# Patient Record
Sex: FEMALE | Race: WHITE | NOT HISPANIC OR LATINO | Employment: OTHER | URBAN - METROPOLITAN AREA
[De-identification: names, ages, dates, MRNs, and addresses within clinical notes are randomized per-mention and may not be internally consistent; named-entity substitution may affect disease eponyms.]

---

## 2017-02-02 PROBLEM — E03.4 HYPOTHYROIDISM DUE TO ACQUIRED ATROPHY OF THYROID: Status: ACTIVE | Noted: 2017-02-02

## 2017-02-02 PROBLEM — E78.5 DYSLIPIDEMIA, GOAL LDL BELOW 130: Status: ACTIVE | Noted: 2017-02-02

## 2017-07-12 ENCOUNTER — PATIENT OUTREACH (OUTPATIENT)
Dept: HEALTH INFORMATION MANAGEMENT | Facility: OTHER | Age: 82
End: 2017-07-12

## 2017-07-18 NOTE — PROGRESS NOTES
Attempt #:2    WebIZ Checked & Epic Updated: no  HealthConnect Verified: no  Verify PCP: yes    Communication Preference Obtained: yes     Review Care Team: yes    Annual Wellness Visit Scheduling  1. Scheduling Status:Not Scheduled. Patient states they are not interested        [place] (Attempt to Schedule EACH [place] Gap![place] [facility] [place]      [place] Due   Topic Date Due   • IMM DTaP/Tdap/Td Vaccine (1 - Tdap) 07/03/1949   • PAP SMEAR  07/03/1951   • BONE DENSITY  07/03/1995   • COLONOSCOPY  02/26/2005   • MAMMOGRAM  02/25/2017         [employer] Activation: sent activation code  [employer] Sangita: no  Virtual Visits: no  Opt In to Text Messages: no

## 2018-07-06 ENCOUNTER — OFFICE VISIT (OUTPATIENT)
Dept: URGENT CARE | Facility: CLINIC | Age: 83
End: 2018-07-06
Payer: MEDICARE

## 2018-07-06 ENCOUNTER — APPOINTMENT (OUTPATIENT)
Dept: RADIOLOGY | Facility: IMAGING CENTER | Age: 83
End: 2018-07-06
Attending: FAMILY MEDICINE
Payer: MEDICARE

## 2018-07-06 VITALS
BODY MASS INDEX: 22.15 KG/M2 | SYSTOLIC BLOOD PRESSURE: 138 MMHG | DIASTOLIC BLOOD PRESSURE: 80 MMHG | HEART RATE: 77 BPM | HEIGHT: 63 IN | WEIGHT: 125 LBS | TEMPERATURE: 98.1 F | OXYGEN SATURATION: 96 %

## 2018-07-06 DIAGNOSIS — T14.8XXA WOUND OF SKIN: ICD-10-CM

## 2018-07-06 DIAGNOSIS — S62.525B OPEN NONDISPLACED FRACTURE OF DISTAL PHALANX OF LEFT THUMB, INITIAL ENCOUNTER: Primary | ICD-10-CM
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
## 2018-07-06 DIAGNOSIS — S63.602A SPRAIN OF LEFT THUMB, UNSPECIFIED SITE OF FINGER, INITIAL ENCOUNTER: ICD-10-CM

## 2018-07-06 PROCEDURE — 73140 X-RAY EXAM OF FINGER(S): CPT | Mod: TC,LT | Performed by: FAMILY MEDICINE

## 2018-07-06 PROCEDURE — 90714 TD VACC NO PRESV 7 YRS+ IM: CPT | Performed by: FAMILY MEDICINE

## 2018-07-06 PROCEDURE — 99214 OFFICE O/P EST MOD 30 MIN: CPT | Mod: 25 | Performed by: FAMILY MEDICINE

## 2018-07-06 PROCEDURE — 12002 RPR S/N/AX/GEN/TRNK2.6-7.5CM: CPT | Performed by: FAMILY MEDICINE

## 2018-07-06 PROCEDURE — 90471 IMMUNIZATION ADMIN: CPT | Mod: 59 | Performed by: FAMILY MEDICINE

## 2018-07-06 RX ORDER — CEPHALEXIN 250 MG/1
250 CAPSULE ORAL 3 TIMES DAILY
Qty: 9 CAP | Refills: 0 | Status: SHIPPED | OUTPATIENT
Start: 2018-07-06 | End: 2018-07-09

## 2018-07-06 ASSESSMENT — ENCOUNTER SYMPTOMS
CHILLS: 0
FOCAL WEAKNESS: 0
DIZZINESS: 0
FEVER: 0

## 2018-07-06 NOTE — PROGRESS NOTES
"Subjective:      Vivian Basurto is a 88 y.o. female who presents with Laceration (laceration left thumb)    Chief Complaint   Patient presents with   • Laceration     laceration left thumb        - This is a very pleasant 88 y.o. female with complaints of trip/fall 1hr ago and hurt/cut Lt thumb. No head trauma/loc               ALLERGIES:  Patient has no known allergies.     PMH:  Past Medical History:   Diagnosis Date   • Exercise-induced asthma    • Hypothyroidism    • Osteopenia         MEDS:    Current Outpatient Prescriptions:   •  cephALEXin (KEFLEX) 250 MG Cap, Take 1 Cap by mouth 3 times a day for 3 days., Disp: 9 Cap, Rfl: 0  •  levothyroxine (SYNTHROID) 88 MCG Tab, Take 1 Tab by mouth Every morning on an empty stomach., Disp: 90 Tab, Rfl: 1  •  ATROVENT HFA 17 MCG/ACT Aero Soln, INHALE TWO PUFFS BY MOUTH EVERY 6 HOURS AND IN THE MORNING AS NEEDED FOR EXERCISE-INDUCED ASTHMA, Disp: 3 Inhaler, Rfl: 0  •  ibuprofen (MOTRIN) 200 MG TABS, Take 200 mg by mouth every 6 hours as needed. As needed , Disp: , Rfl:   •  acetaminophen (TYLENOL) 500 MG TABS, Take 500-1,000 mg by mouth every 6 hours as needed. As needed , Disp: , Rfl:     ** I have documented what I find to be significant in regards to past medical, social, family and surgical history  in my HPI or under PMH/PSH/FH review section, otherwise it is contributory **           HPI    Review of Systems   Constitutional: Negative for chills and fever.   Neurological: Negative for dizziness and focal weakness.          Objective:     /80   Pulse 77   Temp 36.7 °C (98.1 °F)   Ht 1.6 m (5' 3\")   Wt 56.7 kg (125 lb)   SpO2 96%   BMI 22.14 kg/m²      Physical Exam   Constitutional: She appears well-developed. No distress.   HENT:   Head: Normocephalic and atraumatic.   Cardiovascular: Regular rhythm.    No murmur heard.  Pulmonary/Chest: Effort normal. No respiratory distress.   Neurological: She is alert. She exhibits normal muscle tone.   Skin: Skin is " warm and dry.   Psychiatric: She has a normal mood and affect. Judgment normal.   Nursing note and vitals reviewed.  Lt thumb: full srom, NVI. ~2.7cm lac             Assessment/Plan:         1. Open nondisplaced fracture of distal phalanx of left thumb, initial encounter     2. Wound of skin  DX-FINGER(S) 2+ LEFT    TD =>6yo IM    cephALEXin (KEFLEX) 250 MG Cap   3. Fall, initial encounter     4. Sprain of left thumb, unspecified site of finger, initial encounter             Procedure: Laceration Repair    - wound cleaned and/or irrigated w/ NS.  -Sterile/clean technique throughout  -Local anesthesia with 2% lidocaine  -Closed with 7#  5-0 Nylon interrupted sutures with good wound approximation. Care was taken not to disturb/injure underlying bones, joint, joint-space, tendons, nerves and major vessels   -Polysporin and dressing placed  -Patient tolerated well  - wound care/instructions discussed  - 2 day wound check advised      f/u sports med thumb fx

## 2018-07-08 ENCOUNTER — OFFICE VISIT (OUTPATIENT)
Dept: URGENT CARE | Facility: CLINIC | Age: 83
End: 2018-07-08
Payer: MEDICARE

## 2018-07-08 VITALS
HEIGHT: 63 IN | RESPIRATION RATE: 16 BRPM | WEIGHT: 124 LBS | BODY MASS INDEX: 21.97 KG/M2 | OXYGEN SATURATION: 95 % | SYSTOLIC BLOOD PRESSURE: 130 MMHG | HEART RATE: 77 BPM | TEMPERATURE: 97.6 F | DIASTOLIC BLOOD PRESSURE: 58 MMHG

## 2018-07-08 DIAGNOSIS — Z51.89 VISIT FOR WOUND CHECK: ICD-10-CM

## 2018-07-08 PROCEDURE — 99202 OFFICE O/P NEW SF 15 MIN: CPT | Performed by: PHYSICIAN ASSISTANT

## 2018-07-08 RX ORDER — CLINDAMYCIN HYDROCHLORIDE 300 MG/1
300 CAPSULE ORAL 2 TIMES DAILY
Qty: 14 CAP | Refills: 0 | Status: SHIPPED | OUTPATIENT
Start: 2018-07-08 | End: 2018-07-15

## 2018-07-08 ASSESSMENT — ENCOUNTER SYMPTOMS
CONSTITUTIONAL NEGATIVE: 1
MUSCULOSKELETAL NEGATIVE: 1
NEUROLOGICAL NEGATIVE: 1
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
## 2023-02-07 PROBLEM — R55 VASOVAGAL SYNCOPE: Status: ACTIVE | Noted: 2023-02-07

## 2023-02-07 PROBLEM — I49.1 PAC (PREMATURE ATRIAL CONTRACTION): Status: ACTIVE | Noted: 2023-02-07

## 2023-04-13 ENCOUNTER — APPOINTMENT (OUTPATIENT)
Dept: ADMISSIONS | Facility: MEDICAL CENTER | Age: 88
End: 2023-04-13
Attending: OPHTHALMOLOGY
Payer: MEDICARE

## 2023-04-18 ENCOUNTER — PRE-ADMISSION TESTING (OUTPATIENT)
Dept: ADMISSIONS | Facility: MEDICAL CENTER | Age: 88
End: 2023-04-18
Attending: OPHTHALMOLOGY
Payer: MEDICARE

## 2023-05-17 ENCOUNTER — HOSPITAL ENCOUNTER (OUTPATIENT)
Facility: MEDICAL CENTER | Age: 88
End: 2023-05-17
Attending: OPHTHALMOLOGY | Admitting: OPHTHALMOLOGY
Payer: MEDICARE

## 2023-05-17 ENCOUNTER — ANESTHESIA EVENT (OUTPATIENT)
Dept: SURGERY | Facility: MEDICAL CENTER | Age: 88
End: 2023-05-17

## 2023-05-17 ENCOUNTER — ANESTHESIA (OUTPATIENT)
Dept: SURGERY | Facility: MEDICAL CENTER | Age: 88
End: 2023-05-17

## 2023-05-17 VITALS
WEIGHT: 130.29 LBS | BODY MASS INDEX: 24.6 KG/M2 | HEIGHT: 61 IN | DIASTOLIC BLOOD PRESSURE: 84 MMHG | TEMPERATURE: 97.6 F | HEART RATE: 63 BPM | OXYGEN SATURATION: 94 % | SYSTOLIC BLOOD PRESSURE: 177 MMHG | RESPIRATION RATE: 18 BRPM

## 2023-05-17 RX ORDER — SODIUM CHLORIDE, SODIUM LACTATE, POTASSIUM CHLORIDE, CALCIUM CHLORIDE 600; 310; 30; 20 MG/100ML; MG/100ML; MG/100ML; MG/100ML
INJECTION, SOLUTION INTRAVENOUS CONTINUOUS
Status: DISCONTINUED | OUTPATIENT
Start: 2023-05-17 | End: 2023-05-17 | Stop reason: HOSPADM

## 2023-05-17 ASSESSMENT — FIBROSIS 4 INDEX: FIB4 SCORE: 3.57

## 2023-05-17 NOTE — OR NURSING
Pt had OJ, toast, and chicken salad at 0845 today. Updated Dr. Butler. Dr. Butler and Dr. Taveras came to bedside to inform pt plan to reschedule d/t NPO status. Pt understanding of plan. Pt does not have history of HTN or take any medication, noted high BP on admit today in high 170s systolic, declines headache/nausea/vision changes. Pt will return to the ED if she develops any of these symptoms, states she was feeling anxious for surgery. Discussed with pt and daughter to obtain home BP monitor and check at the same time each day. She will see her PCP if BP is not controlled. Ambulated out of department with all belongings.

## 2023-05-18 ENCOUNTER — APPOINTMENT (OUTPATIENT)
Dept: ADMISSIONS | Facility: MEDICAL CENTER | Age: 88
End: 2023-05-18
Attending: OPHTHALMOLOGY
Payer: MEDICARE

## 2023-06-05 ENCOUNTER — PRE-ADMISSION TESTING (OUTPATIENT)
Dept: ADMISSIONS | Facility: MEDICAL CENTER | Age: 88
End: 2023-06-05
Attending: OPHTHALMOLOGY
Payer: MEDICARE

## 2023-06-05 RX ORDER — NEOMYCIN SULFATE, POLYMYXIN B SULFATE, AND DEXAMETHASONE 3.5; 10000; 1 MG/G; [USP'U]/G; MG/G
OINTMENT OPHTHALMIC
COMMUNITY
Start: 2023-05-17

## 2023-06-16 ENCOUNTER — ANESTHESIA EVENT (OUTPATIENT)
Dept: SURGERY | Facility: MEDICAL CENTER | Age: 88
End: 2023-06-16
Payer: MEDICARE

## 2023-06-16 ENCOUNTER — HOSPITAL ENCOUNTER (OUTPATIENT)
Facility: MEDICAL CENTER | Age: 88
End: 2023-06-16
Attending: OPHTHALMOLOGY | Admitting: OPHTHALMOLOGY
Payer: MEDICARE

## 2023-06-16 ENCOUNTER — ANESTHESIA (OUTPATIENT)
Dept: SURGERY | Facility: MEDICAL CENTER | Age: 88
End: 2023-06-16
Payer: MEDICARE

## 2023-06-16 VITALS
RESPIRATION RATE: 19 BRPM | OXYGEN SATURATION: 90 % | WEIGHT: 127.87 LBS | BODY MASS INDEX: 23.53 KG/M2 | DIASTOLIC BLOOD PRESSURE: 71 MMHG | TEMPERATURE: 97.6 F | HEART RATE: 74 BPM | SYSTOLIC BLOOD PRESSURE: 146 MMHG | HEIGHT: 62 IN

## 2023-06-16 PROCEDURE — 700111 HCHG RX REV CODE 636 W/ 250 OVERRIDE (IP): Performed by: ANESTHESIOLOGY

## 2023-06-16 PROCEDURE — 160035 HCHG PACU - 1ST 60 MINS PHASE I: Performed by: OPHTHALMOLOGY

## 2023-06-16 PROCEDURE — 160041 HCHG SURGERY MINUTES - EA ADDL 1 MIN LEVEL 4: Performed by: OPHTHALMOLOGY

## 2023-06-16 PROCEDURE — 99100 ANES PT EXTEME AGE<1 YR&>70: CPT | Performed by: ANESTHESIOLOGY

## 2023-06-16 PROCEDURE — 160046 HCHG PACU - 1ST 60 MINS PHASE II: Performed by: OPHTHALMOLOGY

## 2023-06-16 PROCEDURE — 160025 RECOVERY II MINUTES (STATS): Performed by: OPHTHALMOLOGY

## 2023-06-16 PROCEDURE — 160048 HCHG OR STATISTICAL LEVEL 1-5: Performed by: OPHTHALMOLOGY

## 2023-06-16 PROCEDURE — 700105 HCHG RX REV CODE 258: Performed by: OPHTHALMOLOGY

## 2023-06-16 PROCEDURE — 160009 HCHG ANES TIME/MIN: Performed by: OPHTHALMOLOGY

## 2023-06-16 PROCEDURE — 160002 HCHG RECOVERY MINUTES (STAT): Performed by: OPHTHALMOLOGY

## 2023-06-16 PROCEDURE — 700101 HCHG RX REV CODE 250: Performed by: OPHTHALMOLOGY

## 2023-06-16 PROCEDURE — 00140 ANES PROCEDURES ON EYE NOS: CPT | Performed by: ANESTHESIOLOGY

## 2023-06-16 PROCEDURE — 700101 HCHG RX REV CODE 250: Performed by: ANESTHESIOLOGY

## 2023-06-16 PROCEDURE — 160029 HCHG SURGERY MINUTES - 1ST 30 MINS LEVEL 4: Performed by: OPHTHALMOLOGY

## 2023-06-16 RX ORDER — TETRACAINE HYDROCHLORIDE 5 MG/ML
SOLUTION OPHTHALMIC
Status: DISCONTINUED
Start: 2023-06-16 | End: 2023-06-16 | Stop reason: HOSPADM

## 2023-06-16 RX ORDER — DEXAMETHASONE SODIUM PHOSPHATE 4 MG/ML
INJECTION, SOLUTION INTRA-ARTICULAR; INTRALESIONAL; INTRAMUSCULAR; INTRAVENOUS; SOFT TISSUE PRN
Status: DISCONTINUED | OUTPATIENT
Start: 2023-06-16 | End: 2023-06-16 | Stop reason: SURG

## 2023-06-16 RX ORDER — OXYCODONE HCL 5 MG/5 ML
5 SOLUTION, ORAL ORAL
Status: DISCONTINUED | OUTPATIENT
Start: 2023-06-16 | End: 2023-06-16 | Stop reason: HOSPADM

## 2023-06-16 RX ORDER — MEPERIDINE HYDROCHLORIDE 25 MG/ML
25 INJECTION INTRAMUSCULAR; INTRAVENOUS; SUBCUTANEOUS
Status: DISCONTINUED | OUTPATIENT
Start: 2023-06-16 | End: 2023-06-16 | Stop reason: HOSPADM

## 2023-06-16 RX ORDER — ONDANSETRON 2 MG/ML
4 INJECTION INTRAMUSCULAR; INTRAVENOUS
Status: DISCONTINUED | OUTPATIENT
Start: 2023-06-16 | End: 2023-06-16 | Stop reason: HOSPADM

## 2023-06-16 RX ORDER — ONDANSETRON 2 MG/ML
INJECTION INTRAMUSCULAR; INTRAVENOUS PRN
Status: DISCONTINUED | OUTPATIENT
Start: 2023-06-16 | End: 2023-06-16 | Stop reason: SURG

## 2023-06-16 RX ORDER — OXYCODONE HCL 5 MG/5 ML
10 SOLUTION, ORAL ORAL
Status: DISCONTINUED | OUTPATIENT
Start: 2023-06-16 | End: 2023-06-16 | Stop reason: HOSPADM

## 2023-06-16 RX ORDER — CEFAZOLIN SODIUM 1 G/3ML
INJECTION, POWDER, FOR SOLUTION INTRAMUSCULAR; INTRAVENOUS PRN
Status: DISCONTINUED | OUTPATIENT
Start: 2023-06-16 | End: 2023-06-16 | Stop reason: SURG

## 2023-06-16 RX ORDER — LIDOCAINE HYDROCHLORIDE AND EPINEPHRINE BITARTRATE 20; .01 MG/ML; MG/ML
INJECTION, SOLUTION SUBCUTANEOUS
Status: DISCONTINUED | OUTPATIENT
Start: 2023-06-16 | End: 2023-06-16 | Stop reason: HOSPADM

## 2023-06-16 RX ORDER — ERYTHROMYCIN 5 MG/G
OINTMENT OPHTHALMIC
Status: DISCONTINUED | OUTPATIENT
Start: 2023-06-16 | End: 2023-06-16 | Stop reason: HOSPADM

## 2023-06-16 RX ORDER — ERYTHROMYCIN 5 MG/G
OINTMENT OPHTHALMIC
Status: DISCONTINUED
Start: 2023-06-16 | End: 2023-06-16 | Stop reason: HOSPADM

## 2023-06-16 RX ORDER — LIDOCAINE HYDROCHLORIDE 20 MG/ML
INJECTION, SOLUTION INFILTRATION; PERINEURAL
Status: DISCONTINUED
Start: 2023-06-16 | End: 2023-06-16 | Stop reason: HOSPADM

## 2023-06-16 RX ORDER — HYDROMORPHONE HYDROCHLORIDE 1 MG/ML
0.2 INJECTION, SOLUTION INTRAMUSCULAR; INTRAVENOUS; SUBCUTANEOUS
Status: DISCONTINUED | OUTPATIENT
Start: 2023-06-16 | End: 2023-06-16 | Stop reason: HOSPADM

## 2023-06-16 RX ORDER — HYDROMORPHONE HYDROCHLORIDE 1 MG/ML
0.5 INJECTION, SOLUTION INTRAMUSCULAR; INTRAVENOUS; SUBCUTANEOUS
Status: DISCONTINUED | OUTPATIENT
Start: 2023-06-16 | End: 2023-06-16 | Stop reason: HOSPADM

## 2023-06-16 RX ORDER — MIDAZOLAM HYDROCHLORIDE 1 MG/ML
1 INJECTION INTRAMUSCULAR; INTRAVENOUS
Status: DISCONTINUED | OUTPATIENT
Start: 2023-06-16 | End: 2023-06-16 | Stop reason: HOSPADM

## 2023-06-16 RX ORDER — SODIUM CHLORIDE, SODIUM LACTATE, POTASSIUM CHLORIDE, CALCIUM CHLORIDE 600; 310; 30; 20 MG/100ML; MG/100ML; MG/100ML; MG/100ML
INJECTION, SOLUTION INTRAVENOUS CONTINUOUS
Status: DISCONTINUED | OUTPATIENT
Start: 2023-06-16 | End: 2023-06-16 | Stop reason: HOSPADM

## 2023-06-16 RX ORDER — HYDROMORPHONE HYDROCHLORIDE 1 MG/ML
0.1 INJECTION, SOLUTION INTRAMUSCULAR; INTRAVENOUS; SUBCUTANEOUS
Status: DISCONTINUED | OUTPATIENT
Start: 2023-06-16 | End: 2023-06-16 | Stop reason: HOSPADM

## 2023-06-16 RX ORDER — IPRATROPIUM BROMIDE AND ALBUTEROL SULFATE 2.5; .5 MG/3ML; MG/3ML
3 SOLUTION RESPIRATORY (INHALATION)
Status: DISCONTINUED | OUTPATIENT
Start: 2023-06-16 | End: 2023-06-16 | Stop reason: HOSPADM

## 2023-06-16 RX ORDER — LIDOCAINE HYDROCHLORIDE 20 MG/ML
INJECTION, SOLUTION EPIDURAL; INFILTRATION; INTRACAUDAL; PERINEURAL PRN
Status: DISCONTINUED | OUTPATIENT
Start: 2023-06-16 | End: 2023-06-16 | Stop reason: SURG

## 2023-06-16 RX ORDER — KETOROLAC TROMETHAMINE 30 MG/ML
INJECTION, SOLUTION INTRAMUSCULAR; INTRAVENOUS PRN
Status: DISCONTINUED | OUTPATIENT
Start: 2023-06-16 | End: 2023-06-16 | Stop reason: SURG

## 2023-06-16 RX ORDER — DIPHENHYDRAMINE HYDROCHLORIDE 50 MG/ML
12.5 INJECTION INTRAMUSCULAR; INTRAVENOUS
Status: DISCONTINUED | OUTPATIENT
Start: 2023-06-16 | End: 2023-06-16 | Stop reason: HOSPADM

## 2023-06-16 RX ADMIN — LIDOCAINE HYDROCHLORIDE 30 MG: 20 INJECTION, SOLUTION EPIDURAL; INFILTRATION; INTRACAUDAL at 10:23

## 2023-06-16 RX ADMIN — FENTANYL CITRATE 50 MCG: 50 INJECTION, SOLUTION INTRAMUSCULAR; INTRAVENOUS at 10:23

## 2023-06-16 RX ADMIN — DEXAMETHASONE SODIUM PHOSPHATE 4 MG: 4 INJECTION INTRA-ARTICULAR; INTRALESIONAL; INTRAMUSCULAR; INTRAVENOUS; SOFT TISSUE at 10:34

## 2023-06-16 RX ADMIN — SODIUM CHLORIDE, POTASSIUM CHLORIDE, SODIUM LACTATE AND CALCIUM CHLORIDE: 600; 310; 30; 20 INJECTION, SOLUTION INTRAVENOUS at 10:20

## 2023-06-16 RX ADMIN — EPHEDRINE SULFATE 10 MG: 50 INJECTION, SOLUTION INTRAVENOUS at 10:44

## 2023-06-16 RX ADMIN — MIDAZOLAM 1 MG: 1 INJECTION, SOLUTION INTRAMUSCULAR; INTRAVENOUS at 10:20

## 2023-06-16 RX ADMIN — PROPOFOL 50 MG: 10 INJECTION, EMULSION INTRAVENOUS at 10:23

## 2023-06-16 RX ADMIN — ONDANSETRON 4 MG: 2 INJECTION INTRAMUSCULAR; INTRAVENOUS at 10:34

## 2023-06-16 RX ADMIN — KETOROLAC TROMETHAMINE 15 MG: 30 INJECTION, SOLUTION INTRAMUSCULAR; INTRAVENOUS at 10:34

## 2023-06-16 RX ADMIN — CEFAZOLIN 1 G: 1 INJECTION, POWDER, FOR SOLUTION INTRAMUSCULAR; INTRAVENOUS at 10:20

## 2023-06-16 RX ADMIN — ROCURONIUM BROMIDE 25 MG: 10 INJECTION, SOLUTION INTRAVENOUS at 10:23

## 2023-06-16 RX ADMIN — SUGAMMADEX 100 MG: 100 INJECTION, SOLUTION INTRAVENOUS at 11:21

## 2023-06-16 ASSESSMENT — FIBROSIS 4 INDEX: FIB4 SCORE: 3.57

## 2023-06-16 ASSESSMENT — PAIN DESCRIPTION - PAIN TYPE
TYPE: SURGICAL PAIN
TYPE: SURGICAL PAIN

## 2023-06-16 ASSESSMENT — PAIN SCALES - GENERAL: PAIN_LEVEL: 2

## 2023-06-16 NOTE — ANESTHESIA POSTPROCEDURE EVALUATION
Patient: Vivian Basurto    Procedure Summary     Date: 06/16/23 Room / Location: [facility] ROOM 24 / SURGERY SAME DAY [facility]    Anesthesia Start: 1020 Anesthesia Stop: 1133    Procedures:       BILATERAL ECTROPION REPAIR WITH TRANSCONJUNCTIVAL RETRACTORS REINSERTION, BILATERAL LOWER LID TIGHTENING WITH LATERAL CANTHOPLASTY, BILATERAL LOWER LID PUNCTAL DILATION (Bilateral: Eye)      BLEPHAROPLASTY (Bilateral: Eye) Diagnosis: (ECTROPION, PUNCTAL STENOSIS, PUNCTATE KERATITIS)    Surgeons: Dong Taveras M.D. Responsible Provider: Donald Rivera M.D.    Anesthesia Type: general ASA Status: 2          Final Anesthesia Type: general  Last vitals  BP   Blood Pressure : (!) 146/71    Temp   36.4 °C (97.6 °F)    Pulse   74   Resp   19    SpO2   90 %      Anesthesia Post Evaluation    Patient location during evaluation: PACU  Patient participation: complete - patient participated  Level of consciousness: awake and alert  Pain score: 2    Airway patency: patent  Anesthetic complications: no  Cardiovascular status: hemodynamically stable  Respiratory status: acceptable  Hydration status: euvolemic    PONV: none          No notable events documented.

## 2023-06-16 NOTE — ANESTHESIA TIME REPORT
Anesthesia Start and Stop Event Times     Date Time Event    6/16/2023 0954 Ready for Procedure     1020 Anesthesia Start     1133 Anesthesia Stop        Responsible Staff  06/16/23    Name Role Begin End    Donald Rivera M.D. Anesth 1020 1133        Overtime Reason:  no overtime (within assigned shift)    Comments:

## 2023-06-16 NOTE — OR SURGEON
Immediate Post OP Note    PreOp Diagnosis: Ectropion BLL, lid laxity BLL, punctal stenosis BLL      PostOp Diagnosis: Same      Procedure(s):  BILATERAL ECTROPION REPAIR WITH TRANSCONJUNCTIVAL RETRACTORS REINSERTION, BILATERAL LOWER LID TIGHTENING WITH LATERAL CANTHOPLASTY, BILATERAL LOWER LID PUNCTAL DILATION - Wound Class: Clean Contaminated  BLEPHAROPLASTY - Wound Class: Clean Contaminated    Surgeon(s):  Dong Taveras M.D.    Anesthesiologist/Type of Anesthesia:  Anesthesiologist: Donald Rivera M.D./General    Surgical Staff:  Circulator: Barbara Gupta R.N.  Scrub Person: Filiberto Deleon    Specimens removed if any:  * No specimens in log *    Estimated Blood Loss: <5cc    Findings: As dx.     Complications: None        6/16/2023 11:33 AM Dong Taveras M.D.

## 2023-06-16 NOTE — ANESTHESIA PROCEDURE NOTES
Airway    Date/Time: 6/16/2023 10:24 AM    Performed by: Donald Rivera M.D.  Authorized by: Donald Rivera M.D.    Location:  OR  Urgency:  Elective  Indications for Airway Management:  Anesthesia      Spontaneous Ventilation: absent    Sedation Level:  Deep  Preoxygenated: Yes    Final Airway Type:  Supraglottic airway  Final Supraglottic Airway:  Flexible LMA    SGA Size:  3  Number of Attempts at Approach:  1

## 2023-06-16 NOTE — OR NURSING
1131 Arrived from OR. ID verified. Report received. Attached to monitors.6L 02 mask to LMA respirations even and unlabored. Vss.     1133 LMA removed patient on 3L mask respiration even and unlabored.     1219 Discharge instructions given to patient and family both verbalize understanding copy of instructions given to family.    1228 Escorted via w/c to responsible adult with all personal belongings.

## 2023-06-16 NOTE — ANESTHESIA PREPROCEDURE EVALUATION
Case: [id number] Date/Time: 06/16/23 1145    Procedures:       BILATERAL ECTROPION REPAIR WITH TRANSCONJUNCTIVAL RETRACTORS REINSERTION, BILATERAL LOWER LID TIGHTENING WITH LATERAL CANTHOPLASTY, BILATERAL LOWER LID PUNCTAL DILATION      BLEPHAROPLASTY    Pre-op diagnosis: ECTROPION, PUNCTAL STENOSIS, PUNCTATE KERATITIS    Location: [facility] ROOM 24 / SURGERY SAME DAY [facility]    Surgeons: Dong Taveras M.D.          Relevant Problems   CARDIAC   (positive) PAC (premature atrial contraction)      ENDO   (positive) Hypothyroidism due to acquired atrophy of thyroid       Physical Exam    Airway   Mallampati: II  TM distance: >3 FB  Neck ROM: full       Cardiovascular - normal exam  Rhythm: regular  Rate: normal  (-) murmur     Dental - normal exam           Pulmonary - normal exam  Breath sounds clear to auscultation     Abdominal    Neurological - normal exam                 Anesthesia Plan    ASA 2       Plan - general       Airway plan will be LMA          Induction: intravenous    Postoperative Plan: Postoperative administration of opioids is intended.    Pertinent diagnostic labs and testing reviewed    Informed Consent:    Anesthetic plan and risks discussed with patient.    Use of blood products discussed with: patient whom consented to blood products.

## 2023-06-16 NOTE — OP REPORT
DATE OF SERVICE:  06/16/2023     ATTENDING SURGEON:  Dong Taveras MD     ANESTHESIOLOGIST:  Donald Rivera MD     ANESTHESIA:  General with local supplementation.     PREOPERATIVE DIAGNOSES:  1.  Severe tarsal ectropion, bilateral lower eyelids.  2.  Horizontal lid laxity, bilateral lower eyelids.  3.  Severe punctal stenosis, bilateral lower eyelids.     POSTOPERATIVE DIAGNOSES:    1.  Severe tarsal ectropion, bilateral lower eyelids.  2.  Horizontal lid laxity, bilateral lower eyelids.  3.  Severe punctal stenosis, bilateral lower eyelids.     PROCEDURES PERFORMED:  1.  Ectropion repair with transconjunctival retractors reinsertion and   rotational suture placement, bilateral lower eyelids.  2.  Horizontal lid tightening with bilateral lateral canthoplasty, bilateral   lower eyelids.  3.  Punctal dilation, bilateral lower eyelids.     SPECIMENS:  None.     IMPLANTS:  None.     COMPLICATIONS:  None.     ESTIMATED BLOOD LOSS:  Less than 5 mL     INDICATIONS FOR PROCEDURE:  This is a 89-year-old female with history of   severe tarsal ectropion of the bilateral lower eyelids.  There was also   significant punctal stenosis and the lid laxity.  Because of this, her eyes   were red and irritated and tearing. In order to address this, the above listed   procedures were recommended to the patient.  Risks, benefits and alternatives   of surgery were explained to the patient in detail and informed consent was   signed.     PROCEDURE IN DETAIL:  The patient was brought to the operating room and laid   supine on operating table.  After induction of general anesthesia, the   bilateral lower eyelids were infiltrated with 2% lidocaine with epinephrine in   a transconjunctival and transcutaneous manner.  Additional infiltration was   placed in the lateral canthal area down to the orbital rim. Full face was   prepped and draped in usual sterile fashion.  Attention was turned to the   punctal dilation first.  The bilateral  lower lid puncta were generously   dilated with a punctal dilator on the right side, it was more stenotic and   required a piercing of the small amount of scar tissue at the punctal opening   using a sharp end of a sharp Javier scissors.  Then, the punctal dilator was   then inserted to dilate the punctum generously.     Attention was turned to the right lower eyelid where the transconjunctival   retractors reinsertion was performed.  Palpebral conjunctival incision was   made at the inferior tarsal border with sharp Javier scissors extending from   the punctum to just shy of the lateral canthus.  Sharp dissection was carried   an inferior direction in a subconjunctival space to identify the lower lid   retractors.  Bipolar cautery was used for hemostasis.  Then, 3 double-armed   5-0 chromic gut sutures were placed in a mattress fashion by first engaging   the inferior edge of the conjunctival incision, and then bringing it through   the lower lid retractors in a horizontal mattress pattern, then bringing it   out through the conjunctival edge, then bring each suture through the inferior   tarsal border then bringing the end of each suture down through the inferior   fornix exiting the eyelid full thickness. Three such sutures were placed,   evenly spaced.  They were left untied for the moment.     A lateral canthoplasty was necessary to tighten the lower eyelid.  Because lid   laxity contributed to the [place] of the eyelid malposition, failure to   address this would result in recurrent ectropion.  This was a separate   procedure as it was not connected to the initial conjunctival incision in any   way.  A lateral canthotomy was performed using Dewitt tenotomy scissors.    Sharp dissection was carried down to the lateral orbital rim periosteum.  The   periosteum was cleared off using a [place] elevator.  Bipolar cautery was used   for hemostasis.  A small full thickness wedge of the temporal right lower    eyelid was excised commensurate with the amount of lid tightening that was   necessary. Lateral canthus was then reconstructed by reconnecting the lateral   tarsal edge to the lateral orbital rim periosteum using a double-armed 5-0   [place] suture in a mattress fashion, bringing it through the lateral   orbital rim periosteum and tied down.  Then, the lateral canthal angle was   reconstructed with a buried 7-0 Vicryl suture through the gray line of the   upper and lower eyelid at the lateral canthal angle.  Then, the canthotomy was   closed with a buried 5-0 Vicryl and the orbicularis followed by interrupted   7-0 Vicryl sutures in the skin.     Now, the rotational sutures were tied.  This closed the conjunctival incision   and plicated the lower lid retractors to the inferior tarsal border and   created rotational vector to rotate the eyelid margin inwards.     The identical procedures were then performed on the left lower eyelid.  The   only difference was that because it was slightly less ectropion/tarsal   eversion only two chromic mattress sutures were used instead of 3.  The   patient tolerated the procedure well and the eyes were rinsed out, dressed   with antibiotic steroid ointment.  The patient was extubated and brought to   PACU in stable condition.        ______________________________  MD MANUELA Ramey/SUSAN    DD:  06/16/2023 11:41  DT:  06/16/2023 12:11    Job#:  [id number]

## 2023-06-16 NOTE — DISCHARGE INSTRUCTIONS
OCULOPLASTICS SURGERY POSTOP INSTRUCTIONS:  - No heavy lifting, bending, stooping, straining, or exercise for 2 weeks following surgery.   - Keep head elevated.  - Keep wound or dressing clean and dry.  Do not get wet.    - Apply cool compresses to operative eyelid(s) for 20 minutes on and 20 minutes off while awake for the first 48 hrs.  Then switch to warm compresses 4 times per day until postop appt.   - DO NOT rub or pull on the operative eyelid(s).  - Wear eye shield at night over the operative eye(s) while sleeping for 3 weeks after surgery. (RN: please provide [place] shield and tape for patient).   - Do not take any aspirin or NSAID (e.g., Motrin, ibuprofen, Advil, naproxen, etc) for 1 week after surgery.   - Please take over-the-counter Tylenol (acetaminophen) as needed for pain.   - Please call Dr. Taveras's office with any questions or concerns.  [phone].     Postop Medications: Prescriptions have all been e-Rx'd to the patient's preferred pharmacy prior to surgery.     - Maxitrol ophthalmic ointment apply thin layer 3 times per day to incision lines.    - Over the counter Tylenol (acetaminophen) as needed for pain.   If any questions arise, call your provider.  If your provider is not available, please feel free to call the Surgical Center at [phone].    MEDICATIONS: Resume taking daily medication.  Take prescribed pain medication with food.  If no medication is prescribed, you may take non-aspirin pain medication if needed.  PAIN MEDICATION CAN BE VERY CONSTIPATING.  Take a stool softener or laxative such as senokot, pericolace, or milk of magnesia if needed.    Last pain medication given at     What to Expect Post Anesthesia    Rest and take it easy for the first 24 hours.  A responsible adult is recommended to remain with you during that time.  It is normal to feel sleepy.  We encourage you to not do anything that requires balance, judgment or coordination.    FOR 24 HOURS DO NOT:  Drive, operate  machinery or run household appliances.  Drink beer or alcoholic beverages.  Make important decisions or sign legal documents.    To avoid nausea, slowly advance diet as tolerated, avoiding spicy or greasy foods for the first day.  Add more substantial food to your diet according to your provider's instructions.  Babies can be fed formula or breast milk as soon as they are hungry.  INCREASE FLUIDS AND FIBER TO AVOID CONSTIPATION.    MILD FLU-LIKE SYMPTOMS ARE NORMAL.  YOU MAY EXPERIENCE GENERALIZED MUSCLE ACHES, THROAT IRRITATION, HEADACHE AND/OR SOME NAUSEA.

## (undated) DEVICE — CANISTER SUCTION 3000ML MECHANICAL FILTER AUTO SHUTOFF MEDI-VAC NONSTERILE LF DISP  (40EA/CA)

## (undated) DEVICE — SUTURE 6-0 PLAIN GUT G-1 D/A 18 (12PK/BX)"

## (undated) DEVICE — MASK OXYGEN VNYL ADLT MED CONC WITH 7 FOOT TUBING  - (50EA/CA)

## (undated) DEVICE — CANISTER SUCTION RIGID RED 1500CC (40EA/CA)

## (undated) DEVICE — GOWN SURGICAL X-LARGE ULTRA - FILM-REINFORCED (20/CA)

## (undated) DEVICE — SUTURE FAST ABSORBANT 6-0 PLAIN GUT PC-1 (12PK/BX)

## (undated) DEVICE — SUTURE GENERAL

## (undated) DEVICE — SODIUM CHL IRRIGATION 0.9% 1000ML (12EA/CA)

## (undated) DEVICE — PEN SKIN MARKER W/RULER - (50EA/BX)

## (undated) DEVICE — CANNULA O2 COMFORT SOFT EAR ADULT 7 FT TUBING (50/CA)

## (undated) DEVICE — SLEEVE VASO CALF MED - (10PR/CA)

## (undated) DEVICE — CANNULA W/ SUPPLY TUBING O2 - (50/CA)

## (undated) DEVICE — PACK KO - (3/CA)

## (undated) DEVICE — Device

## (undated) DEVICE — SHIELD OPTH AL GRTR CVR FOX (50EA/BX)

## (undated) DEVICE — MASK AIRWAY FLEXIBLE SINGLE-USE SIZE 3 CHILDREN (10EA/BX)

## (undated) DEVICE — PENCIL ELECTSURG 10FT BTN SWH - (50/CA)

## (undated) DEVICE — SET LEADWIRE 5 LEAD BEDSIDE DISPOSABLE ECG (1SET OF 5/EA)

## (undated) DEVICE — WATER IRRIGATION STERILE 1000ML (12EA/CA)

## (undated) DEVICE — SUCTION INSTRUMENT YANKAUER BULBOUS TIP W/O VENT (50EA/CA)

## (undated) DEVICE — KIT  I.V. START (100EA/CA)

## (undated) DEVICE — TUBING CLEARLINK DUO-VENT - C-FLO (48EA/CA)

## (undated) DEVICE — SENSOR OXIMETER ADULT SPO2 RD SET (20EA/BX)

## (undated) DEVICE — SUTURE 7-0 VICRYL TG140-8 (12PK/BX)

## (undated) DEVICE — TOWEL STOP TIMEOUT SAFETY FLAG (40EA/CA)

## (undated) DEVICE — SUTURE 5-0 MERSIL S-14 (12PK/BX)

## (undated) DEVICE — SUTURE 5-0 VICRYL D/A S-14 18 (12PK/BX)"

## (undated) DEVICE — LACTATED RINGERS INJ 1000 ML - (14EA/CA 60CA/PF)

## (undated) DEVICE — GOWN WARMING STANDARD FLEX - (30/CA)

## (undated) DEVICE — APPLICATOR COTTONTIP 3 IN - STERILE (10EA/PK 100PK/CA)

## (undated) DEVICE — GLOVES, #7 1/2 BIOGEL M

## (undated) DEVICE — GLOVE BIOGEL PI INDICATOR SZ 7.0 SURGICAL PF LF - (50/BX 4BX/CA)

## (undated) DEVICE — SYRINGE 30 ML LL (56/BX)

## (undated) DEVICE — SUTURE EYE

## (undated) DEVICE — TUBE CONNECTING SUCTION - CLEAR PLASTIC STERILE 72 IN (50EA/CA)